# Patient Record
Sex: FEMALE | Race: WHITE | Employment: STUDENT | URBAN - METROPOLITAN AREA
[De-identification: names, ages, dates, MRNs, and addresses within clinical notes are randomized per-mention and may not be internally consistent; named-entity substitution may affect disease eponyms.]

---

## 2017-05-11 ENCOUNTER — TELEPHONE (OUTPATIENT)
Dept: FAMILY MEDICINE CLINIC | Facility: CLINIC | Age: 22
End: 2017-05-11

## 2017-05-11 ENCOUNTER — OFFICE VISIT (OUTPATIENT)
Dept: FAMILY MEDICINE CLINIC | Facility: CLINIC | Age: 22
End: 2017-05-11

## 2017-05-11 DIAGNOSIS — Z02.9 ENCOUNTERS FOR ADMINISTRATIVE PURPOSE: Primary | ICD-10-CM

## 2017-05-11 NOTE — TELEPHONE ENCOUNTER
Immunizations pended to Dr. Ronnell Toro to place  Future Appointments  Date Time Provider Gerson Obrien   5/16/2017 2:30 PM Hever Fernandez MD [facility] [address]   5/18/2017 3:00 PM [facility] NURSE [facility] [address]

## 2017-05-11 NOTE — PROGRESS NOTES
Apolinar Blevins is a 25year old female who presents for a school physical exam.      Wt Readings from Last 3 Encounters:  09/07/16 : 145 lb 8 oz (77 %*, Z = 0.76)  07/25/16 : 140 lb (72 %*, Z = 0.57)  07/10/16 : 138 lb (69 %*, Z = 0.50)    * Growth percen heartburn  GENITAL/: no dysuria, urgency or frequency; no hernias  MUSCULOSKELETAL: no joint complaints upper or lower extremities  NEURO: no sensory or motor complaint  PSYCH: no symptoms of depression or anxiety  HEMATOLOGY: denies hx anemia; denies br

## 2017-05-16 ENCOUNTER — OFFICE VISIT (OUTPATIENT)
Dept: FAMILY MEDICINE CLINIC | Facility: CLINIC | Age: 22
End: 2017-05-16

## 2017-05-16 VITALS
HEIGHT: 64.25 IN | BODY MASS INDEX: 26.29 KG/M2 | TEMPERATURE: 98 F | DIASTOLIC BLOOD PRESSURE: 78 MMHG | HEART RATE: 101 BPM | SYSTOLIC BLOOD PRESSURE: 120 MMHG | OXYGEN SATURATION: 96 % | RESPIRATION RATE: 12 BRPM | WEIGHT: 154 LBS

## 2017-05-16 DIAGNOSIS — Z00.00 ROUTINE ADULT HEALTH MAINTENANCE: Primary | ICD-10-CM

## 2017-05-16 PROCEDURE — 99395 PREV VISIT EST AGE 18-39: CPT | Performed by: FAMILY MEDICINE

## 2017-05-16 PROCEDURE — 90716 VAR VACCINE LIVE SUBQ: CPT | Performed by: FAMILY MEDICINE

## 2017-05-16 PROCEDURE — 90472 IMMUNIZATION ADMIN EACH ADD: CPT | Performed by: FAMILY MEDICINE

## 2017-05-16 PROCEDURE — 90734 MENACWYD/MENACWYCRM VACC IM: CPT | Performed by: FAMILY MEDICINE

## 2017-05-16 PROCEDURE — 90471 IMMUNIZATION ADMIN: CPT | Performed by: FAMILY MEDICINE

## 2017-05-16 PROCEDURE — 86580 TB INTRADERMAL TEST: CPT | Performed by: FAMILY MEDICINE

## 2017-05-18 ENCOUNTER — NURSE ONLY (OUTPATIENT)
Dept: FAMILY MEDICINE CLINIC | Facility: CLINIC | Age: 22
End: 2017-05-18

## 2017-05-18 ENCOUNTER — LAB ENCOUNTER (OUTPATIENT)
Dept: LAB | Age: 22
End: 2017-05-18
Attending: FAMILY MEDICINE
Payer: COMMERCIAL

## 2017-05-18 DIAGNOSIS — Z00.00 ROUTINE ADULT HEALTH MAINTENANCE: ICD-10-CM

## 2017-05-18 PROCEDURE — 36415 COLL VENOUS BLD VENIPUNCTURE: CPT | Performed by: FAMILY MEDICINE

## 2017-05-18 PROCEDURE — 87517 HEPATITIS B DNA QUANT: CPT | Performed by: FAMILY MEDICINE

## 2017-05-18 NOTE — PROGRESS NOTES
HPI:   Marianna Raza is a 25year old female who presents for a complete physical exam. Symptoms: denies discharge, itching, burning or dysuria. Patient has no complaints today. 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     Wt Readings from L metaphysis   • Dysuria    • Fracture of phalanx of left ring finger    • Right ankle sprain    • Left ankle sprain       No past surgical history on file.    Family History   Problem Relation Age of Onset   • Diabetes       Grandmother   • Breast Cancer Mot normal bowel sounds,soft, non tender, no masses, HSM or tenderness  MUSCULOSKELETAL: gait chaz,l no gross M/S defect.   EXTREMITIES: no clubbing, cyanosis, or edema  NEURO: oriented times three, cranial nerves are grossly intact, no gross motor or sensory

## 2017-05-22 ENCOUNTER — TELEPHONE (OUTPATIENT)
Dept: FAMILY MEDICINE CLINIC | Facility: CLINIC | Age: 22
End: 2017-05-22

## 2017-05-22 DIAGNOSIS — Z01.84 IMMUNITY STATUS TESTING: Primary | ICD-10-CM

## 2017-05-22 NOTE — PROGRESS NOTES
Quick Note:    I called the lab and they are switching the Hepatitis B DNA, Quantitative to the Hepatitis B Surface Antibody.  The test was ordered incorrectly by Dr Ilya Terrell.  ______

## 2017-05-23 ENCOUNTER — NURSE ONLY (OUTPATIENT)
Dept: FAMILY MEDICINE CLINIC | Facility: CLINIC | Age: 22
End: 2017-05-23

## 2017-05-23 PROCEDURE — 86580 TB INTRADERMAL TEST: CPT | Performed by: FAMILY MEDICINE

## 2017-05-23 NOTE — PATIENT INSTRUCTIONS
Pt was seen today for her 2nd TB skin test in her 2-step TB. Pt tolerated well. Pt will come back to get it read within 48-72 hrs.  appt scheduled  Future Appointments  Date Time Provider Gerson Obrien   5/25/2017 2:30 PM [facility] NURSE [facility] [address]

## 2017-05-25 ENCOUNTER — NURSE ONLY (OUTPATIENT)
Dept: FAMILY MEDICINE CLINIC | Facility: CLINIC | Age: 22
End: 2017-05-25

## 2017-08-03 DIAGNOSIS — Z30.41 ENCOUNTER FOR SURVEILLANCE OF CONTRACEPTIVE PILLS: ICD-10-CM

## 2017-08-03 RX ORDER — LEVONORGESTREL AND ETHINYL ESTRADIOL 0.1-0.02MG
KIT ORAL
Qty: 84 TABLET | Refills: 0 | Status: SHIPPED | OUTPATIENT
Start: 2017-08-03 | End: 2017-10-23

## 2017-10-23 DIAGNOSIS — Z30.41 ENCOUNTER FOR SURVEILLANCE OF CONTRACEPTIVE PILLS: Primary | ICD-10-CM

## 2017-10-24 RX ORDER — LEVONORGESTREL AND ETHINYL ESTRADIOL 0.1-0.02MG
1 KIT ORAL
Qty: 84 TABLET | Refills: 0 | Status: SHIPPED | OUTPATIENT
Start: 2017-10-24 | End: 2018-01-15

## 2017-10-24 NOTE — TELEPHONE ENCOUNTER
Requested Medications   Levonorgestrel-Ethinyl Estrad (AVIANE) 0.1-20 MG-MCG Oral Tab  Take 1 tablet by mouth once daily.        Disp: 84 tablet Refills: 0    Class: Normal Start: 10/23/2017   For: Encounter for surveillance of contraceptive pills  Original

## 2018-01-15 DIAGNOSIS — Z30.41 ENCOUNTER FOR SURVEILLANCE OF CONTRACEPTIVE PILLS: Primary | ICD-10-CM

## 2018-01-16 RX ORDER — LEVONORGESTREL AND ETHINYL ESTRADIOL 0.1-0.02MG
1 KIT ORAL
Qty: 84 TABLET | Refills: 1 | Status: SHIPPED | OUTPATIENT
Start: 2018-01-16 | End: 2018-06-30

## 2018-01-16 NOTE — TELEPHONE ENCOUNTER
Gynecology Medication Protocol Failed1/15 8:34 AM   PASS-PENDING LAST PAP WNL--VIA MANUAL LOOKUP     Requesting Aviane   LOV: 5/16/17  RTC: 1 year   Last Relevant Labs: none   Filled: 10/24/17 #84 with 0 refills    No future appointments.     Pended if okay

## 2018-06-30 DIAGNOSIS — Z30.41 ENCOUNTER FOR SURVEILLANCE OF CONTRACEPTIVE PILLS: ICD-10-CM

## 2018-06-30 RX ORDER — LEVONORGESTREL AND ETHINYL ESTRADIOL 0.1-0.02MG
1 KIT ORAL DAILY
Qty: 1 PACKAGE | Refills: 0 | Status: SHIPPED | OUTPATIENT
Start: 2018-06-30 | End: 2018-07-30

## 2018-06-30 NOTE — PROGRESS NOTES
Patient called and it [place] 2 weeks. Has not been seen since May 2017. Will give courtesy refill of OCP and she will schedule AWV when she is back in July.     Aicha Fu, DO  Family Medicine

## 2018-07-30 DIAGNOSIS — Z30.41 ENCOUNTER FOR SURVEILLANCE OF CONTRACEPTIVE PILLS: ICD-10-CM

## 2018-07-31 RX ORDER — LEVONORGESTREL AND ETHINYL ESTRADIOL 0.1-0.02MG
1 KIT ORAL DAILY
Qty: 28 TABLET | Refills: 0 | Status: SHIPPED | OUTPATIENT
Start: 2018-07-31 | End: 2018-08-28

## 2018-07-31 NOTE — TELEPHONE ENCOUNTER
Gynecology Medication Protocol Failed7/30 3:03 PM   PASS-PENDING LAST PAP WNL--VIA MANUAL LOOKUP    Physical or Pelvic/Breast in past 12 or next 3 mos--VIA MANUAL LOOKUP     Requesting Vienva   LOV: 5/16/17  RTC: 1 year   Last Relevant Labs: none in past y

## 2018-08-28 ENCOUNTER — OFFICE VISIT (OUTPATIENT)
Dept: FAMILY MEDICINE CLINIC | Facility: CLINIC | Age: 23
End: 2018-08-28
Payer: COMMERCIAL

## 2018-08-28 VITALS
RESPIRATION RATE: 16 BRPM | HEIGHT: 64.25 IN | BODY MASS INDEX: 26.63 KG/M2 | HEART RATE: 76 BPM | DIASTOLIC BLOOD PRESSURE: 72 MMHG | SYSTOLIC BLOOD PRESSURE: 116 MMHG | WEIGHT: 156 LBS

## 2018-08-28 DIAGNOSIS — Z30.41 ENCOUNTER FOR SURVEILLANCE OF CONTRACEPTIVE PILLS: ICD-10-CM

## 2018-08-28 DIAGNOSIS — N92.6 IRREGULAR MENSES: ICD-10-CM

## 2018-08-28 DIAGNOSIS — Z11.3 SCREEN FOR STD (SEXUALLY TRANSMITTED DISEASE): ICD-10-CM

## 2018-08-28 DIAGNOSIS — N94.6 DYSMENORRHEA: Primary | ICD-10-CM

## 2018-08-28 LAB
CONTROL LINE PRESENT WITH A CLEAR BACKGROUND (YES/NO): YES YES/NO
PREGNANCY TEST, URINE: NEGATIVE

## 2018-08-28 PROCEDURE — 87591 N.GONORRHOEAE DNA AMP PROB: CPT | Performed by: PHYSICIAN ASSISTANT

## 2018-08-28 PROCEDURE — 99213 OFFICE O/P EST LOW 20 MIN: CPT | Performed by: PHYSICIAN ASSISTANT

## 2018-08-28 PROCEDURE — 87491 CHLMYD TRACH DNA AMP PROBE: CPT | Performed by: PHYSICIAN ASSISTANT

## 2018-08-28 PROCEDURE — 81025 URINE PREGNANCY TEST: CPT | Performed by: PHYSICIAN ASSISTANT

## 2018-08-28 RX ORDER — LEVONORGESTREL AND ETHINYL ESTRADIOL 0.1-0.02MG
1 KIT ORAL DAILY
Qty: 3 PACKAGE | Refills: 3 | Status: SHIPPED | OUTPATIENT
Start: 2018-08-28 | End: 2019-08-05

## 2018-08-28 RX ORDER — LEVONORGESTREL AND ETHINYL ESTRADIOL 0.1-0.02MG
KIT ORAL
Qty: 28 TABLET | Refills: 0 | OUTPATIENT
Start: 2018-08-28

## 2018-08-28 NOTE — PATIENT INSTRUCTIONS
Thank you for choosing Rhoda Yi PA-C at [place] [address]  To Do: Irais Gonzalez  1. Pt to continue medications  2.  Follow-up for annual well visit with pap smear      • Please signup for MY CHART, which is electronic access to your record if please call Central Scheduling at [phone]  Please allow our office 5 business days to contact you regarding any testing results.     Refill policies:   Allow 3 business days for refills; controlled substances may take longer and must be picked up from

## 2018-08-28 NOTE — PROGRESS NOTES
[facility] Internal Medicine Progress Note    CC:  Patient presents with:  Medication Request      HPI:   HPI  OCP  Pt has been on OCP for about 2 years  She denies any breakthrough bleeding   She is currently sexually active  Pt was initially st Soft. Bowel sounds are normal. There is no tenderness. There is no rebound and no guarding. Lymphadenopathy:     She has no cervical adenopathy. Neurological: She is alert and oriented to person, place, and time. Skin: Skin is warm and dry.    Psychia

## 2018-08-30 LAB
C TRACH DNA SPEC QL NAA+PROBE: NEGATIVE
N GONORRHOEA DNA SPEC QL NAA+PROBE: NEGATIVE

## 2018-10-24 ENCOUNTER — OFFICE VISIT (OUTPATIENT)
Dept: FAMILY MEDICINE CLINIC | Facility: CLINIC | Age: 23
End: 2018-10-24
Payer: COMMERCIAL

## 2018-10-24 VITALS
HEART RATE: 73 BPM | OXYGEN SATURATION: 98 % | WEIGHT: 157 LBS | SYSTOLIC BLOOD PRESSURE: 110 MMHG | BODY MASS INDEX: 26.8 KG/M2 | DIASTOLIC BLOOD PRESSURE: 70 MMHG | HEIGHT: 64.25 IN | TEMPERATURE: 98 F | RESPIRATION RATE: 16 BRPM

## 2018-10-24 DIAGNOSIS — J02.9 SORE THROAT: Primary | ICD-10-CM

## 2018-10-24 PROCEDURE — 87880 STREP A ASSAY W/OPTIC: CPT | Performed by: NURSE PRACTITIONER

## 2018-10-24 PROCEDURE — 99213 OFFICE O/P EST LOW 20 MIN: CPT | Performed by: NURSE PRACTITIONER

## 2018-10-24 PROCEDURE — 87081 CULTURE SCREEN ONLY: CPT | Performed by: NURSE PRACTITIONER

## 2018-10-24 RX ORDER — PREDNISONE 20 MG/1
40 TABLET ORAL DAILY
Qty: 6 TABLET | Refills: 0 | Status: SHIPPED | OUTPATIENT
Start: 2018-10-24 | End: 2018-10-27

## 2018-10-24 NOTE — PROGRESS NOTES
CHIEF COMPLAINT:   Patient presents with:  Sore Throat: x 2 days, fever last night, body aches, chills      HPI:   Maria Esther Fonseca is a 26year old female presents to clinic with complaint of sore throat. Patient has had for 2 days.  Patient reports fol GI: denies vomiting or diarrhea  NEURO: denies dizziness or lightheadedness    EXAM:   /70   Pulse 73   Temp 98.1 °F (36.7 °C) (Oral)   Resp 16   Ht 64.25\"   Wt 157 lb   SpO2 98%   BMI 26.74 kg/m²   GENERAL: well developed, well nourished, in no mukesh Pharyngitis (sore throat) is often due to a virus. It can also be caused by streptococcus (strep), bacteria. This is often called strep throat.  Both viral and strep infections can cause throat pain that is worse when swallowing, aching all over, headache,  · Use throat lozenges or numbing throat sprays to help reduce pain. Gargling with warm salt water will also help reduce throat pain. Dissolve 1/2 teaspoon of salt in 1 glass of warm water. Children can sip on juice or a popsicle.  Children 5 years and older · •Can’t swallow liquids, a lot of drooling, or can’t open mouth wide due to throat pain  · Signs of dehydration, such as very dark urine or no urine, sunken eyes, dizziness  · Trouble breathing or noisy breathing  · Muffled voice  · New rash  · Other symp

## 2019-02-25 ENCOUNTER — TELEPHONE (OUTPATIENT)
Dept: FAMILY MEDICINE CLINIC | Facility: CLINIC | Age: 24
End: 2019-02-25

## 2019-07-30 DIAGNOSIS — Z30.41 ENCOUNTER FOR SURVEILLANCE OF CONTRACEPTIVE PILLS: ICD-10-CM

## 2019-07-31 RX ORDER — LEVONORGESTREL AND ETHINYL ESTRADIOL 0.1-0.02MG
KIT ORAL
Qty: 84 TABLET | Refills: 0 | OUTPATIENT
Start: 2019-07-31

## 2019-08-03 ENCOUNTER — TELEPHONE (OUTPATIENT)
Dept: FAMILY MEDICINE CLINIC | Facility: CLINIC | Age: 24
End: 2019-08-03

## 2019-08-03 DIAGNOSIS — Z30.41 ENCOUNTER FOR SURVEILLANCE OF CONTRACEPTIVE PILLS: ICD-10-CM

## 2019-08-03 NOTE — TELEPHONE ENCOUNTER
Patient states she moved to [facility], does not have a pcp yet, would like one more month of BC, please advise.

## 2019-08-05 RX ORDER — LEVONORGESTREL AND ETHINYL ESTRADIOL 0.1-0.02MG
1 KIT ORAL DAILY
Qty: 1 PACKAGE | Refills: 0 | Status: SHIPPED | OUTPATIENT
Start: 2019-08-05 | End: 2019-09-04

## (undated) NOTE — LETTER
Date: 10/24/2018    Patient Name: Cira Dakins          To Whom it may concern: The above patient was seen at the [facility] for treatment of a medical condition.     This patient should be excused from attending work 10/25/18    The pat

## (undated) NOTE — MR AVS SNAPSHOT
[facility] [address] [place] [address] [address] [facility]  [phone]               Thank you for choosing us for your health care visit with [facility] NURSE.   We are glad to serve you and happy to provide you with this your Zip Code and Date of Birth to complete the sign-up process. If you do not sign up before the expiration date, you must request a new code.     Your unique [employer] Access Code: [email or web address]  Expires: 7/17/2017  3:00 PM    If you have questions, you can c

## (undated) NOTE — MR AVS SNAPSHOT
[facility] [address] [place] [address] [address] [facility] [place] [place]  [phone] [id number] [phone]               Thank you for choosing us for your health care visit with Jyoti Mcmillan MD.  We are glad to serve you and happy to provide you wit Take 1 tablet by mouth once daily.    Commonly known as:  Preeti Abraham                   Results of Recent Testing       Immunizations Administered in the Office Today     MENINGOCOCCAL     Tb Intradermal Test     VARICELLA       MyChart     Sign up for MyChart, active are less likely to develop some chronic diseases than adults who are inactive.      HOW TO GET STARTED: HOW TO STAY MOTIVATED:   Start activities slowly and build up over time Do what you like   Get your heart pumping – brisk walking, biking, swimmin